# Patient Record
Sex: MALE | Race: BLACK OR AFRICAN AMERICAN | NOT HISPANIC OR LATINO | Employment: UNEMPLOYED | URBAN - METROPOLITAN AREA
[De-identification: names, ages, dates, MRNs, and addresses within clinical notes are randomized per-mention and may not be internally consistent; named-entity substitution may affect disease eponyms.]

---

## 2024-07-25 ENCOUNTER — OFFICE VISIT (OUTPATIENT)
Dept: PRIMARY CARE | Facility: HOSPITAL | Age: 24
End: 2024-07-25
Payer: COMMERCIAL

## 2024-07-25 VITALS
OXYGEN SATURATION: 99 % | WEIGHT: 273 LBS | SYSTOLIC BLOOD PRESSURE: 110 MMHG | BODY MASS INDEX: 36.98 KG/M2 | HEART RATE: 62 BPM | RESPIRATION RATE: 16 BRPM | HEIGHT: 72 IN | TEMPERATURE: 98.4 F | DIASTOLIC BLOOD PRESSURE: 64 MMHG

## 2024-07-25 DIAGNOSIS — Z00.00 ROUTINE HEALTH MAINTENANCE: Primary | ICD-10-CM

## 2024-07-25 PROCEDURE — 3008F BODY MASS INDEX DOCD: CPT | Performed by: INTERNAL MEDICINE

## 2024-07-25 PROCEDURE — 99395 PREV VISIT EST AGE 18-39: CPT | Performed by: INTERNAL MEDICINE

## 2024-07-25 PROCEDURE — 1036F TOBACCO NON-USER: CPT | Performed by: INTERNAL MEDICINE

## 2024-07-25 ASSESSMENT — ENCOUNTER SYMPTOMS
LOSS OF SENSATION IN FEET: 0
DEPRESSION: 0
OCCASIONAL FEELINGS OF UNSTEADINESS: 0

## 2024-07-25 NOTE — PATIENT INSTRUCTIONS
Fasting blood work now     Plant based, whole food diet - 5 servings of fruits and veg per day, 35 grams of fiber per day   Avoid: red meat, processed meats (deli or cured meats), saturated fat and sugar (36 grams)    Movies on [employer]:  The Game Changers  Blue Zones  You Are What You Eat  What the Health  [place] Over KnC4X Discovery    Web Sites for Recipes:  Blue Zones  Homestead Over NetLex  Plant Tesaris   Nutritionfacts.org     Authors:  Dr. Jarad Lugo     Cookbooks:  The Get Healthy, Go Vegan Cookbook: 125 Easy and Delicious Recipes to Jump-Start Weight Loss and Help you Feel Great - Dr. Jarad Cardenas's Cookbook for Reversing Diabetes:  150 Recipes Scientifically Proven to Reverse Diabetes Without Drugs - Dr. Jarad Cardenas  The Prevent and Reverse Heart Disease Cookbook - Dr. Lucas Flynn   The [place] Study All-Star Collection:  Whole Food, Plant-Based Recipes from Your Favorite Vegan  - Dr. ANTONIO Valverde  How Not To Die - Dr. Marlo Saha

## 2024-07-25 NOTE — PROGRESS NOTES
Chief Complaint   Patient presents with    primary care physical    Establish Care         History Of Present Illness:    Tony Williamson is a 24 y.o. male presenting to establish care, update health maintenance and complete sports form for school.       Tony has a history of a right shoulder dislocation and partial labral tear.  He reports having a procedure on his shoulder without any significant pain or impairment subsequent to his procedure.  He has a history of a concussion sustained during high school playing football.  He did not lose consciousness, and he does not have any long-term sequela of that concussion.  He is entering his last year in college and is required to get his sports physical for rugby.  He exercises on regular basis doing a combination of weights and aerobics.  His physical endurance is good.  He denies chest pain or shortness of breath with exertion.  He denies dizziness or lightheadedness with physical exertion.  He does not get palpitations.  His appetite is normal and he denies any change in bowel or bladder habits.  He was screened for sickle cell disease 1 year ago, and hemoglobin electrophoresis was normal.  He reports recent STI screening at [facility].  He tested negative for chlamydia.  He goes to sleep about 1-2 in the morning and wakes up around 9 to 9:30 AM.  He usually sleeps through the night.  He does snore, and he reports 1-2 episodes of nocturia per night.    Shoulder dislocation and labral tear  MRI 7/5/18  IMPRESSION:  1. Sequela of anterior shoulder dislocation with a shallow Hill-Sachs  deformity and osseous and soft tissue Bankart.  2. Additional tear of the labrum involving the anterior-superior,  anterior labrum.      Active Medical Problems:  Patient Active Problem List    Diagnosis Date Noted    Routine health maintenance 07/25/2024    BMI 37.0-37.9, adult 07/25/2024       Past Medical History:  Past Medical History:   Diagnosis Date    Shoulder dislocation      right side, with labral tear       Past Surgical History:  Past Surgical History:   Procedure Laterality Date    WISDOM TOOTH EXTRACTION           Social History:  Social History     Tobacco Use    Smoking status: Never    Smokeless tobacco: Never   Vaping Use    Vaping status: Never Used   Substance Use Topics    Alcohol use: Never    Drug use: Never         Family History:  Family History   Problem Relation Name Age of Onset    Hypertension Father          Allergies:  Patient has no known allergies.    Outpatient Medications:  No current outpatient medications on file.    Review of Systems:  Pertinent positives in review of systems outlined above.  Complete ROS otherwise negative.        Last Recorded Vitals:  Vitals:    07/25/24 1432 07/25/24 1515   BP: 149/80 110/64   BP Location: Left arm    Patient Position: Sitting    BP Cuff Size: Adult    Pulse: 62    Resp: 16    Temp: 36.9 °C (98.4 °F)    TempSrc: Temporal    SpO2: 99%    Weight: 124 kg (273 lb)    Height: 1.829 m (6')    Body mass index is 37.03 kg/m².        Physical Exam  HENT:      Mouth/Throat:      Pharynx: Oropharynx is clear.   Eyes:      Extraocular Movements: Extraocular movements intact.      Conjunctiva/sclera: Conjunctivae normal.      Pupils: Pupils are equal, round, and reactive to light.   Cardiovascular:      Rate and Rhythm: Normal rate and regular rhythm.      Pulses: Normal pulses.      Heart sounds: Normal heart sounds.   Pulmonary:      Effort: Pulmonary effort is normal.      Breath sounds: Normal breath sounds.   Abdominal:      General: Abdomen is flat. Bowel sounds are normal. There is no distension.      Palpations: Abdomen is soft. There is no mass.      Tenderness: There is no abdominal tenderness.   Genitourinary:     Testes: Normal.   Musculoskeletal:      Right lower leg: No edema.      Left lower leg: No edema.   Neurological:      General: No focal deficit present.        The ASCVD Risk score (Harvard DK, et al., 2019)  failed to calculate for the following reasons:    The 2019 ASCVD risk score is only valid for ages 40 to 79      Assessment/Plan   Problem List Items Addressed This Visit       Routine health maintenance - Primary     Blood pressure in a good range.  Fasting blood sugar and fasting lipid profile will be obtained.  Testicular exam is normal.  Advised on self exam.  Recent STI screening was negative.  Encouraged eye exam.  Tdap is due.  Tony does not want to get a vaccine today.         Relevant Orders    Comprehensive Metabolic Panel    Lipid Panel    CBC and Auto Differential    BMI 37.0-37.9, adult     Blood pressure is normal.  Will screen for diabetes and dyslipidemia.  We discussed strategies for maintaining healthy body weight including exercising for 30 minutes 5 days/week and eating a plant-based whole food diet.  Tony was directed to online resources to help with meal planning.  He should have a physical once yearly.         Relevant Orders    Comprehensive Metabolic Panel         Jayjay Seo MD

## 2024-07-25 NOTE — ASSESSMENT & PLAN NOTE
Blood pressure in a good range.  Fasting blood sugar and fasting lipid profile will be obtained.  Testicular exam is normal.  Advised on self exam.  Recent STI screening was negative.  Encouraged eye exam.  Tdap is due.  Tony does not want to get a vaccine today.

## 2024-07-25 NOTE — ASSESSMENT & PLAN NOTE
Blood pressure is normal.  Will screen for diabetes and dyslipidemia.  We discussed strategies for maintaining healthy body weight including exercising for 30 minutes 5 days/week and eating a plant-based whole food diet.  Tony was directed to online resources to help with meal planning.  He should have a physical once yearly.

## 2024-08-19 ENCOUNTER — APPOINTMENT (OUTPATIENT)
Dept: CARDIOLOGY | Facility: HOSPITAL | Age: 24
End: 2024-08-19
Payer: COMMERCIAL

## 2024-08-22 ENCOUNTER — APPOINTMENT (OUTPATIENT)
Dept: ORTHOPEDIC SURGERY | Facility: HOSPITAL | Age: 24
End: 2024-08-22
Payer: COMMERCIAL

## 2025-05-28 ENCOUNTER — APPOINTMENT (OUTPATIENT)
Dept: URGENT CARE | Age: 25
End: 2025-05-28